# Patient Record
Sex: MALE | Race: OTHER
[De-identification: names, ages, dates, MRNs, and addresses within clinical notes are randomized per-mention and may not be internally consistent; named-entity substitution may affect disease eponyms.]

---

## 2019-06-23 ENCOUNTER — HOSPITAL ENCOUNTER (INPATIENT)
Dept: HOSPITAL 93 - ER | Age: 44
LOS: 8 days | Discharge: NURSING, REHAB OR LONG-TERM CARE | DRG: 309 | End: 2019-07-01
Attending: INTERNAL MEDICINE | Admitting: INTERNAL MEDICINE
Payer: COMMERCIAL

## 2019-06-23 VITALS — WEIGHT: 288 LBS | HEIGHT: 72 IN | BODY MASS INDEX: 39.01 KG/M2

## 2019-06-23 DIAGNOSIS — Z79.01: ICD-10-CM

## 2019-06-23 DIAGNOSIS — Z79.4: ICD-10-CM

## 2019-06-23 DIAGNOSIS — I48.2: Primary | ICD-10-CM

## 2019-06-23 DIAGNOSIS — I50.22: ICD-10-CM

## 2019-06-23 DIAGNOSIS — I86.1: ICD-10-CM

## 2019-06-23 DIAGNOSIS — I24.9: ICD-10-CM

## 2019-06-23 DIAGNOSIS — E11.65: ICD-10-CM

## 2019-06-23 DIAGNOSIS — N50.89: ICD-10-CM

## 2019-06-23 DIAGNOSIS — I82.5Z3: ICD-10-CM

## 2019-06-23 DIAGNOSIS — I11.0: ICD-10-CM

## 2019-06-23 DIAGNOSIS — M54.2: ICD-10-CM

## 2019-06-23 DIAGNOSIS — Z95.0: ICD-10-CM

## 2019-06-23 DIAGNOSIS — K76.89: ICD-10-CM

## 2019-06-23 DIAGNOSIS — E66.01: ICD-10-CM

## 2019-06-23 DIAGNOSIS — J90: ICD-10-CM

## 2019-06-23 DIAGNOSIS — K76.0: ICD-10-CM

## 2019-06-23 DIAGNOSIS — N17.8: ICD-10-CM

## 2019-06-23 DIAGNOSIS — E87.6: ICD-10-CM

## 2019-06-23 PROCEDURE — 4A033R1 MEASUREMENT OF ARTERIAL SATURATION, PERIPHERAL, PERCUTANEOUS APPROACH: ICD-10-PCS | Performed by: INTERNAL MEDICINE

## 2019-06-23 PROCEDURE — BW40ZZZ ULTRASONOGRAPHY OF ABDOMEN: ICD-10-PCS | Performed by: INTERNAL MEDICINE

## 2019-06-23 PROCEDURE — B246ZZZ ULTRASONOGRAPHY OF RIGHT AND LEFT HEART: ICD-10-PCS | Performed by: INTERNAL MEDICINE

## 2019-06-23 PROCEDURE — B54DZZZ ULTRASONOGRAPHY OF BILATERAL LOWER EXTREMITY VEINS: ICD-10-PCS | Performed by: INTERNAL MEDICINE

## 2019-06-23 PROCEDURE — BW24ZZZ COMPUTERIZED TOMOGRAPHY (CT SCAN) OF CHEST AND ABDOMEN: ICD-10-PCS | Performed by: INTERNAL MEDICINE

## 2019-06-23 PROCEDURE — 3E0F7GC INTRODUCTION OF OTHER THERAPEUTIC SUBSTANCE INTO RESPIRATORY TRACT, VIA NATURAL OR ARTIFICIAL OPENING: ICD-10-PCS | Performed by: INTERNAL MEDICINE

## 2019-06-23 NOTE — NUR
SE RECIBE PTE ALERTA Y ORIENTADO POR NAMITA EN AMBULANCIA. PTE REFIERE
PRESENTAR PALPITACIONES Y DOLOR EN ESPALDA HALIMA Y BAJA DESDE  HOY EN LA NOCHE.
PTE NIEGA PRESENTA DOLOR EN EL PECHO.
PTE ES PRESENTADO A DR. FAIR Y COLOCADO EN AREA DE CHEST PAIN CONECTADO EN
MONITOR CARDIACO.

## 2019-06-23 NOTE — NUR
SE RECIVE POTE MASCULINO DEL AREA DE CPU ER,SE CONECTA A MONITOR CARDIACO
FEROZ Y COOOCADO EN CAMA #3
,FUE EVALUADO POR ,SE TG MUETRAS Y SE ENVIAN A
LABORATORIO,SE COLOCAN 3 H/L PATENTES LIBRES DE EDEMA Y ENROJECIMIENTO,SE
ADMINISTRAN MEDICAMENTOS LOS CUALKES TOLERA,SE COLOCA TR1DIL @3,CARDIZEN
@5,KCL@4,SE ORIENTA A PTE SOBRE AREA DE ICU,PTE HASTA EL MOMENTO COOPERADOR
ARNALDO SE OBSERVA SOMNOLIENTO,SE MANTIENE CON V/M 50%,LE FUE REALIZADA X RAY,SE
MANTIENE A PTE EN VIGILANCIA FEROZ POR CAMBIOS.

## 2019-06-23 NOTE — NUR
se comienza a bajar tridil para ser descontinuado.PTE SE REMUEVE EL VENTURY Y
SE REORIENTA SOBRE IMPORTSNCIA DEL TRTAMIENTO Y ARNALDO PERMANECE SIN EL,SE
MANTIENE EN VIGILAMCIA FEROZ POR CAMBIOS.

## 2019-06-23 NOTE — NUR
1230AM SE REALIZA MUESTRA DE OLIVIA DE FORMA ASEPTICA, PACIENTE TOLERA
INTERBENCION DEL RN.
 
0200AM SE ORIENTA PACIENTE SOBRE TRATAMIENTO MEDICO EL CUAL REFIERE ENTENDER,
SE ADMINISTRA MEDICAMENTO SUKUMAR ORDEN MEDICA, PACIENTE TOLERA INTERVENCION DEL
RN. SE REALIZA TRASLADO A LA UNIDAD DE CRITICO EN ER. SE ENTREGA A 
HERACLIO.

## 2019-06-23 NOTE — NUR
SE RECIBE PTE DEL TURNO ANTERIOR EN CAMA CON BARANDAS ELEVADAS CON IVFS PATENTE
ANDRES DE EDEMA Y ERITEMA CARDIZEM AT 5ML/HR V/M AL 50% SATURANDO 98%. SE
MANTIENE EN OBSERVACION Y PENDIENTE A CONSULTA CON .

## 2019-06-24 PROCEDURE — BV44ZZZ ULTRASONOGRAPHY OF SCROTUM: ICD-10-PCS | Performed by: INTERNAL MEDICINE

## 2019-06-28 PROCEDURE — 4A12X4Z MONITORING OF CARDIAC ELECTRICAL ACTIVITY, EXTERNAL APPROACH: ICD-10-PCS | Performed by: INTERNAL MEDICINE
